# Patient Record
Sex: MALE | Race: WHITE | Employment: FULL TIME | URBAN - METROPOLITAN AREA
[De-identification: names, ages, dates, MRNs, and addresses within clinical notes are randomized per-mention and may not be internally consistent; named-entity substitution may affect disease eponyms.]

---

## 2023-05-09 DIAGNOSIS — L20.9 ATOPIC DERMATITIS, UNSPECIFIED TYPE: ICD-10-CM

## 2023-05-09 RX ORDER — BETAMETHASONE DIPROPIONATE 0.5 MG/G
OINTMENT, AUGMENTED TOPICAL 2 TIMES DAILY
COMMUNITY
End: 2023-05-09 | Stop reason: SDUPTHER

## 2023-05-09 RX ORDER — BETAMETHASONE DIPROPIONATE 0.5 MG/G
OINTMENT, AUGMENTED TOPICAL 2 TIMES DAILY
Qty: 15 G | Refills: 0 | Status: SHIPPED | OUTPATIENT
Start: 2023-05-09 | End: 2023-07-20

## 2023-05-09 NOTE — TELEPHONE ENCOUNTER
Pt. Requesting refill;  Betamethasone dipropionate aug. 0.05 ointment  [facility]   Appt-5/16/23

## 2023-05-09 NOTE — TELEPHONE ENCOUNTER
Betamethasone 0.05 ointment    [facility]    NOV - 05/16/23    W/1 refill  BN

## 2023-05-15 PROBLEM — E03.9 HYPOTHYROIDISM: Status: ACTIVE | Noted: 2023-05-15

## 2023-05-15 PROBLEM — N52.9 ERECTILE DYSFUNCTION: Status: ACTIVE | Noted: 2023-05-15

## 2023-05-15 PROBLEM — R00.2 PALPITATIONS: Status: ACTIVE | Noted: 2023-05-15

## 2023-05-15 PROBLEM — G47.33 OSA (OBSTRUCTIVE SLEEP APNEA): Status: ACTIVE | Noted: 2023-05-15

## 2023-05-15 PROBLEM — F17.200 NICOTINE ADDICTION: Status: ACTIVE | Noted: 2023-05-15

## 2023-05-15 PROBLEM — E55.9 VITAMIN D DEFICIENCY: Status: ACTIVE | Noted: 2023-05-15

## 2023-05-15 PROBLEM — E78.00 HYPERCHOLESTEROLEMIA: Status: ACTIVE | Noted: 2023-05-15

## 2023-05-15 PROBLEM — I10 HYPERTENSION: Status: ACTIVE | Noted: 2023-05-15

## 2023-05-15 PROBLEM — L20.9 ATOPIC DERMATITIS: Status: ACTIVE | Noted: 2023-05-15

## 2023-05-15 RX ORDER — IRBESARTAN 300 MG/1
300 TABLET ORAL DAILY
COMMUNITY
End: 2023-11-20

## 2023-05-15 RX ORDER — LEVOTHYROXINE SODIUM 100 UG/1
100 TABLET ORAL DAILY
COMMUNITY
End: 2023-11-13

## 2023-05-15 RX ORDER — CHLORTHALIDONE 50 MG/1
50 TABLET ORAL DAILY
COMMUNITY
Start: 2022-12-08 | End: 2024-01-03

## 2023-05-15 RX ORDER — CHOLECALCIFEROL (VITAMIN D3) 125 MCG
125 CAPSULE ORAL DAILY
COMMUNITY
Start: 2018-05-20

## 2023-05-15 RX ORDER — ATORVASTATIN CALCIUM 10 MG/1
10 TABLET, FILM COATED ORAL DAILY
COMMUNITY
End: 2023-09-12

## 2023-05-15 RX ORDER — METOPROLOL SUCCINATE 50 MG/1
50 TABLET, EXTENDED RELEASE ORAL DAILY
COMMUNITY
Start: 2023-03-14 | End: 2023-11-20

## 2024-01-18 ENCOUNTER — TELEPHONE (OUTPATIENT)
Dept: PRIMARY CARE | Facility: CLINIC | Age: 54
End: 2024-01-18
Payer: COMMERCIAL

## 2024-01-18 DIAGNOSIS — L20.9 ATOPIC DERMATITIS, UNSPECIFIED TYPE: ICD-10-CM

## 2024-01-18 DIAGNOSIS — E03.9 HYPOTHYROIDISM, UNSPECIFIED: ICD-10-CM

## 2024-01-18 RX ORDER — LEVOTHYROXINE SODIUM 100 UG/1
100 TABLET ORAL DAILY
Qty: 30 TABLET | Refills: 0 | Status: CANCELLED | OUTPATIENT
Start: 2024-01-18

## 2024-01-18 RX ORDER — BETAMETHASONE DIPROPIONATE 0.5 MG/G
OINTMENT, AUGMENTED TOPICAL 2 TIMES DAILY
Qty: 15 G | Refills: 0 | Status: CANCELLED | OUTPATIENT
Start: 2024-01-18

## 2024-01-18 RX ORDER — LEVOTHYROXINE SODIUM 100 UG/1
100 TABLET ORAL DAILY
Qty: 30 TABLET | Refills: 0 | Status: SHIPPED | OUTPATIENT
Start: 2024-01-18 | End: 2024-02-01 | Stop reason: SDUPTHER

## 2024-01-18 RX ORDER — BETAMETHASONE DIPROPIONATE 0.5 MG/G
OINTMENT, AUGMENTED TOPICAL 2 TIMES DAILY
Qty: 15 G | Refills: 0 | Status: SHIPPED | OUTPATIENT
Start: 2024-01-18

## 2024-01-18 NOTE — TELEPHONE ENCOUNTER
Pt needs refills on   Levothyroxine 100 mcg   Betamethasone 0.05 topical    He is almost out  Next appt 2/2/24    Drug [place]  [phone]   yes

## 2024-01-18 NOTE — TELEPHONE ENCOUNTER
Med refill    levothyroxine (Synthroid, Levoxyl) 100 mcg tablet   TAKE 1 TABLET BY MOUTH DAILY     betamethasone, augmented, (Diprolene) 0.05 % ointment   Apply topically 2 times a day.     [facility] - Osvaldo WEBB

## 2024-01-19 ENCOUNTER — APPOINTMENT (OUTPATIENT)
Dept: PRIMARY CARE | Facility: CLINIC | Age: 54
End: 2024-01-19
Payer: COMMERCIAL

## 2024-02-01 ENCOUNTER — OFFICE VISIT (OUTPATIENT)
Dept: PRIMARY CARE | Facility: CLINIC | Age: 54
End: 2024-02-01
Payer: COMMERCIAL

## 2024-02-01 ENCOUNTER — LAB (OUTPATIENT)
Dept: LAB | Facility: LAB | Age: 54
End: 2024-02-01
Payer: COMMERCIAL

## 2024-02-01 VITALS
HEIGHT: 72 IN | WEIGHT: 236.4 LBS | SYSTOLIC BLOOD PRESSURE: 115 MMHG | BODY MASS INDEX: 32.02 KG/M2 | HEART RATE: 72 BPM | OXYGEN SATURATION: 95 % | DIASTOLIC BLOOD PRESSURE: 63 MMHG

## 2024-02-01 DIAGNOSIS — I10 ESSENTIAL (PRIMARY) HYPERTENSION: ICD-10-CM

## 2024-02-01 DIAGNOSIS — E78.00 PURE HYPERCHOLESTEROLEMIA, UNSPECIFIED: ICD-10-CM

## 2024-02-01 DIAGNOSIS — E78.00 HYPERCHOLESTEROLEMIA: ICD-10-CM

## 2024-02-01 DIAGNOSIS — E03.9 HYPOTHYROIDISM, UNSPECIFIED TYPE: ICD-10-CM

## 2024-02-01 DIAGNOSIS — E03.9 HYPOTHYROIDISM, UNSPECIFIED: ICD-10-CM

## 2024-02-01 DIAGNOSIS — R06.2 WHEEZE: ICD-10-CM

## 2024-02-01 DIAGNOSIS — Z00.00 ANNUAL PHYSICAL EXAM: Primary | ICD-10-CM

## 2024-02-01 DIAGNOSIS — F17.210 CIGARETTE NICOTINE DEPENDENCE WITHOUT COMPLICATION: ICD-10-CM

## 2024-02-01 DIAGNOSIS — I10 PRIMARY HYPERTENSION: ICD-10-CM

## 2024-02-01 DIAGNOSIS — Z00.00 ANNUAL PHYSICAL EXAM: ICD-10-CM

## 2024-02-01 PROCEDURE — 80053 COMPREHEN METABOLIC PANEL: CPT

## 2024-02-01 PROCEDURE — 3074F SYST BP LT 130 MM HG: CPT | Performed by: INTERNAL MEDICINE

## 2024-02-01 PROCEDURE — 84443 ASSAY THYROID STIM HORMONE: CPT

## 2024-02-01 PROCEDURE — 84439 ASSAY OF FREE THYROXINE: CPT

## 2024-02-01 PROCEDURE — 3078F DIAST BP <80 MM HG: CPT | Performed by: INTERNAL MEDICINE

## 2024-02-01 PROCEDURE — 36415 COLL VENOUS BLD VENIPUNCTURE: CPT

## 2024-02-01 PROCEDURE — 99214 OFFICE O/P EST MOD 30 MIN: CPT | Performed by: INTERNAL MEDICINE

## 2024-02-01 PROCEDURE — 80061 LIPID PANEL: CPT

## 2024-02-01 PROCEDURE — 85027 COMPLETE CBC AUTOMATED: CPT

## 2024-02-01 PROCEDURE — 82306 VITAMIN D 25 HYDROXY: CPT

## 2024-02-01 RX ORDER — IRBESARTAN 300 MG/1
300 TABLET ORAL DAILY
Qty: 90 TABLET | Refills: 0 | Status: SHIPPED | OUTPATIENT
Start: 2024-02-01

## 2024-02-01 RX ORDER — CHLORTHALIDONE 50 MG/1
50 TABLET ORAL DAILY
Qty: 90 TABLET | Refills: 0 | Status: SHIPPED | OUTPATIENT
Start: 2024-02-01

## 2024-02-01 RX ORDER — ALBUTEROL SULFATE 90 UG/1
2 AEROSOL, METERED RESPIRATORY (INHALATION)
Qty: 8 G | Refills: 0 | Status: SHIPPED | OUTPATIENT
Start: 2024-02-01 | End: 2025-01-31

## 2024-02-01 RX ORDER — LEVOTHYROXINE SODIUM 100 UG/1
100 TABLET ORAL DAILY
Qty: 30 TABLET | Refills: 0 | Status: SHIPPED | OUTPATIENT
Start: 2024-02-01 | End: 2024-02-06 | Stop reason: DRUGHIGH

## 2024-02-01 RX ORDER — METOPROLOL SUCCINATE 50 MG/1
50 TABLET, EXTENDED RELEASE ORAL DAILY
Qty: 90 TABLET | Refills: 0 | Status: SHIPPED | OUTPATIENT
Start: 2024-02-01

## 2024-02-01 RX ORDER — ATORVASTATIN CALCIUM 10 MG/1
10 TABLET, FILM COATED ORAL DAILY
Qty: 90 TABLET | Refills: 0 | Status: SHIPPED | OUTPATIENT
Start: 2024-02-01

## 2024-02-01 ASSESSMENT — ENCOUNTER SYMPTOMS
COUGH: 0
RHINORRHEA: 1
HEMATURIA: 0
SINUS PRESSURE: 1
PALPITATIONS: 0
VOMITING: 0
CONSTIPATION: 0
LIGHT-HEADEDNESS: 0
NAUSEA: 0
FEVER: 0
DIFFICULTY URINATING: 0
WEAKNESS: 0
CHILLS: 0
DYSURIA: 0
FREQUENCY: 0
FATIGUE: 1
ARTHRALGIAS: 0
MYALGIAS: 0
SHORTNESS OF BREATH: 0
HEADACHES: 0
DIARRHEA: 0
SORE THROAT: 0
DIZZINESS: 0

## 2024-02-01 ASSESSMENT — PATIENT HEALTH QUESTIONNAIRE - PHQ9
2. FEELING DOWN, DEPRESSED OR HOPELESS: NOT AT ALL
1. LITTLE INTEREST OR PLEASURE IN DOING THINGS: NOT AT ALL
SUM OF ALL RESPONSES TO PHQ9 QUESTIONS 1 AND 2: 0

## 2024-02-01 ASSESSMENT — PAIN SCALES - GENERAL: PAINLEVEL: 0-NO PAIN

## 2024-02-01 NOTE — ASSESSMENT & PLAN NOTE
Patient is overdue for blood work and was given a requisition for fasting labs including lipids.  In the meantime he was given a refill on the atorvastatin

## 2024-02-01 NOTE — ASSESSMENT & PLAN NOTE
Blood pressure stable and well-controlled.  He was given refills of all his blood pressure medications and a requisition for annual blood work.

## 2024-02-01 NOTE — PROGRESS NOTES
Subjective   Patient ID: Marshal Newberry is a 53 y.o. male who presents for sinus and follow up for HTN management.  BN    Patient is here for 4-month follow-up for hypertension and medication management.  His wife wanted him to mention that he had a cold but feels like he is already turned the corner.  Symptoms began approximately 6 days ago but says in the last 2 days he is already feeling better.  He has a lot of nasal congestion drainage and a slight tightness in his chest but he continues to smoke cigarettes.  He states he is not ready to quit at this time although he knows he should        Review of Systems   Constitutional:  Positive for fatigue. Negative for chills and fever.   HENT:  Positive for postnasal drip, rhinorrhea and sinus pressure. Negative for sore throat.    Eyes:  Negative for visual disturbance.   Respiratory:  Negative for cough and shortness of breath.    Cardiovascular:  Negative for chest pain, palpitations and leg swelling.   Gastrointestinal:  Negative for constipation, diarrhea, nausea and vomiting.   Genitourinary:  Negative for difficulty urinating, dysuria, frequency, hematuria and urgency.   Musculoskeletal:  Negative for arthralgias and myalgias.   Skin:  Negative for rash.   Neurological:  Negative for dizziness, syncope, weakness, light-headedness and headaches.       Objective   Medication Documentation Review Audit       Reviewed by Maribel Arroyo MD (Physician) on 02/01/24 at 1152      Medication Order Taking? Sig Documenting Provider Last Dose Status   atorvastatin (Lipitor) 10 mg tablet [id number]  TAKE 1 TABLET BY MOUTH DAILY TAKE AS DIRECTED. Maribel Arroyo MD  Active   betamethasone, augmented, (Diprolene) 0.05 % ointment [id number]  Apply topically 2 times a day. Maribel Arroyo MD  Active   betamethasone, augmented, (Diprolene) 0.05 % ointment [id number]  Apply topically 2 times a day. Maribel Arroyo MD  Active   chlorthalidone (Hygroton) 50 mg tablet [id number]  TAKE  1 TABLET ONCE DAILY. Maribel Arroyo MD  Active   cholecalciferol (Vitamin D-3) 125 MCG (5000 UT) capsule [id number]  Take 1 capsule (125 mcg) by mouth once daily. Homer Ayon MD  Active   irbesartan (Avapro) 300 mg tablet [id number]  TAKE 1 TABLET DAILY. Maribel Arroyo MD  Active   levothyroxine (Synthroid, Levoxyl) 100 mcg tablet [id number]  Take 1 tablet (100 mcg) by mouth once daily. Maribel Arroyo MD  Active   metoprolol succinate XL (Toprol-XL) 50 mg 24 hr tablet [id number]  TAKE 1 TABLET DAILY. Maribel Arroyo MD  Active                  Allergies   Allergen Reactions    Lisinopril Cough    Paroxetine Rash     Physical Exam  Constitutional:       Appearance: Normal appearance.   HENT:      Head: Normocephalic and atraumatic.      Nose: Nose normal.   Eyes:      Extraocular Movements: Extraocular movements intact.      Pupils: Pupils are equal, round, and reactive to light.   Cardiovascular:      Rate and Rhythm: Normal rate and regular rhythm.   Pulmonary:      Breath sounds: Wheezing present.   Abdominal:      General: Abdomen is flat. Bowel sounds are normal.      Palpations: Abdomen is soft.   Musculoskeletal:      Right lower leg: No edema.      Left lower leg: No edema.   Neurological:      Mental Status: He is alert.     /63 (BP Location: Left arm, Patient Position: Sitting)   Pulse 72   Ht 1.829 m (6')   Wt 107 kg (236 lb 6.4 oz)   SpO2 95%   BMI 32.06 kg/m²       Assessment/Plan   Problem List Items Addressed This Visit       Hypercholesterolemia     Patient is overdue for blood work and was given a requisition for fasting labs including lipids.  In the meantime he was given a refill on the atorvastatin         Hypertension     Blood pressure stable and well-controlled.  He was given refills of all his blood pressure medications and a requisition for annual blood work.         Hypothyroidism     Patient was given a refill on the levothyroxine 100 mcg daily and he is due for TSH.          Nicotine addiction     Patient has a 40-pack-year history of cigarettes and I did strongly encourage him to quit.  At this time he admits he is just not ready to quit         Wheeze    Relevant Medications    albuterol (Ventolin HFA) 90 mcg/actuation inhaler    Hypothyroidism, unspecified    Relevant Medications    levothyroxine (Synthroid, Levoxyl) 100 mcg tablet    Other Relevant Orders    Lipid Panel    CBC    Comprehensive Metabolic Panel    TSH with reflex to Free T4 if abnormal    Vitamin D 25-Hydroxy,Total (for eval of Vitamin D levels)    Essential (primary) hypertension    Relevant Medications    chlorthalidone (Hygroton) 50 mg tablet    metoprolol succinate XL (Toprol-XL) 50 mg 24 hr tablet    irbesartan (Avapro) 300 mg tablet    Other Relevant Orders    Lipid Panel    CBC    Comprehensive Metabolic Panel    TSH with reflex to Free T4 if abnormal    Vitamin D 25-Hydroxy,Total (for eval of Vitamin D levels)     Other Visit Diagnoses       Annual physical exam    -  Primary    Relevant Orders    Lipid Panel    CBC    Comprehensive Metabolic Panel    TSH with reflex to Free T4 if abnormal    Vitamin D 25-Hydroxy,Total (for eval of Vitamin D levels)    Pure hypercholesterolemia, unspecified        Relevant Medications    atorvastatin (Lipitor) 10 mg tablet    Other Relevant Orders    Lipid Panel    CBC    Comprehensive Metabolic Panel    TSH with reflex to Free T4 if abnormal    Vitamin D 25-Hydroxy,Total (for eval of Vitamin D levels)                   It has been a pleasure seeing you.  Maribel Arroyo MD

## 2024-02-01 NOTE — ASSESSMENT & PLAN NOTE
>>ASSESSMENT AND PLAN FOR HYPERTENSION WRITTEN ON 2/1/2024 12:43 PM BY INDIA HOANG MD    Blood pressure stable and well-controlled.  He was given refills of all his blood pressure medications and a requisition for annual blood work.

## 2024-02-01 NOTE — ASSESSMENT & PLAN NOTE
Patient has a 40-pack-year history of cigarettes and I did strongly encourage him to quit.  At this time he admits he is just not ready to quit

## 2024-02-02 ENCOUNTER — APPOINTMENT (OUTPATIENT)
Dept: PRIMARY CARE | Facility: CLINIC | Age: 54
End: 2024-02-02
Payer: COMMERCIAL

## 2024-02-02 LAB
25(OH)D3 SERPL-MCNC: 38 NG/ML (ref 30–100)
ALBUMIN SERPL BCP-MCNC: 4.7 G/DL (ref 3.4–5)
ALP SERPL-CCNC: 91 U/L (ref 33–120)
ALT SERPL W P-5'-P-CCNC: 42 U/L (ref 10–52)
ANION GAP SERPL CALC-SCNC: 13 MMOL/L (ref 10–20)
AST SERPL W P-5'-P-CCNC: 27 U/L (ref 9–39)
BILIRUB SERPL-MCNC: 0.7 MG/DL (ref 0–1.2)
BUN SERPL-MCNC: 23 MG/DL (ref 6–23)
CALCIUM SERPL-MCNC: 9.9 MG/DL (ref 8.6–10.6)
CHLORIDE SERPL-SCNC: 98 MMOL/L (ref 98–107)
CHOLEST SERPL-MCNC: 171 MG/DL (ref 0–199)
CHOLESTEROL/HDL RATIO: 6.3
CO2 SERPL-SCNC: 29 MMOL/L (ref 21–32)
CREAT SERPL-MCNC: 0.94 MG/DL (ref 0.5–1.3)
EGFRCR SERPLBLD CKD-EPI 2021: >90 ML/MIN/1.73M*2
ERYTHROCYTE [DISTWIDTH] IN BLOOD BY AUTOMATED COUNT: 11.5 % (ref 11.5–14.5)
GLUCOSE SERPL-MCNC: 93 MG/DL (ref 74–99)
HCT VFR BLD AUTO: 43.7 % (ref 41–52)
HDLC SERPL-MCNC: 27 MG/DL
HGB BLD-MCNC: 16 G/DL (ref 13.5–17.5)
LDLC SERPL CALC-MCNC: 95 MG/DL
MCH RBC QN AUTO: 34 PG (ref 26–34)
MCHC RBC AUTO-ENTMCNC: 36.6 G/DL (ref 32–36)
MCV RBC AUTO: 93 FL (ref 80–100)
NON HDL CHOLESTEROL: 144 MG/DL (ref 0–149)
NRBC BLD-RTO: 0 /100 WBCS (ref 0–0)
PLATELET # BLD AUTO: 244 X10*3/UL (ref 150–450)
POTASSIUM SERPL-SCNC: 4 MMOL/L (ref 3.5–5.3)
PROT SERPL-MCNC: 7.9 G/DL (ref 6.4–8.2)
RBC # BLD AUTO: 4.7 X10*6/UL (ref 4.5–5.9)
SODIUM SERPL-SCNC: 136 MMOL/L (ref 136–145)
T4 FREE SERPL-MCNC: 1.51 NG/DL (ref 0.78–1.48)
TRIGL SERPL-MCNC: 244 MG/DL (ref 0–149)
TSH SERPL-ACNC: 0.05 MIU/L (ref 0.44–3.98)
VLDL: 49 MG/DL (ref 0–40)
WBC # BLD AUTO: 8.8 X10*3/UL (ref 4.4–11.3)

## 2024-02-06 DIAGNOSIS — E03.9 HYPOTHYROIDISM, UNSPECIFIED TYPE: Primary | ICD-10-CM

## 2024-02-06 RX ORDER — LEVOTHYROXINE SODIUM 88 UG/1
88 TABLET ORAL DAILY
Qty: 30 TABLET | Refills: 3 | Status: SHIPPED | OUTPATIENT
Start: 2024-02-06 | End: 2025-02-05

## 2024-06-21 ENCOUNTER — APPOINTMENT (OUTPATIENT)
Dept: PRIMARY CARE | Facility: CLINIC | Age: 54
End: 2024-06-21
Payer: COMMERCIAL

## 2024-07-12 ENCOUNTER — APPOINTMENT (OUTPATIENT)
Dept: PRIMARY CARE | Facility: CLINIC | Age: 54
End: 2024-07-12
Payer: COMMERCIAL

## 2024-07-12 ENCOUNTER — LAB (OUTPATIENT)
Dept: LAB | Facility: LAB | Age: 54
End: 2024-07-12
Payer: COMMERCIAL

## 2024-07-12 VITALS
DIASTOLIC BLOOD PRESSURE: 74 MMHG | HEIGHT: 72 IN | BODY MASS INDEX: 31.72 KG/M2 | OXYGEN SATURATION: 94 % | WEIGHT: 234.2 LBS | HEART RATE: 68 BPM | SYSTOLIC BLOOD PRESSURE: 114 MMHG

## 2024-07-12 DIAGNOSIS — E78.00 HYPERCHOLESTEROLEMIA: Primary | ICD-10-CM

## 2024-07-12 DIAGNOSIS — E03.9 HYPOTHYROIDISM, UNSPECIFIED TYPE: ICD-10-CM

## 2024-07-12 DIAGNOSIS — I10 ESSENTIAL (PRIMARY) HYPERTENSION: ICD-10-CM

## 2024-07-12 DIAGNOSIS — E78.00 PURE HYPERCHOLESTEROLEMIA, UNSPECIFIED: ICD-10-CM

## 2024-07-12 LAB
T4 FREE SERPL-MCNC: 1.46 NG/DL (ref 0.78–1.48)
TSH SERPL-ACNC: 0.02 MIU/L (ref 0.44–3.98)

## 2024-07-12 PROCEDURE — 36415 COLL VENOUS BLD VENIPUNCTURE: CPT

## 2024-07-12 PROCEDURE — 84439 ASSAY OF FREE THYROXINE: CPT

## 2024-07-12 PROCEDURE — 3078F DIAST BP <80 MM HG: CPT | Performed by: INTERNAL MEDICINE

## 2024-07-12 PROCEDURE — 84443 ASSAY THYROID STIM HORMONE: CPT

## 2024-07-12 PROCEDURE — 3074F SYST BP LT 130 MM HG: CPT | Performed by: INTERNAL MEDICINE

## 2024-07-12 PROCEDURE — 99214 OFFICE O/P EST MOD 30 MIN: CPT | Performed by: INTERNAL MEDICINE

## 2024-07-12 RX ORDER — METOPROLOL SUCCINATE 50 MG/1
50 TABLET, EXTENDED RELEASE ORAL DAILY
Qty: 90 TABLET | Refills: 3 | Status: SHIPPED | OUTPATIENT
Start: 2024-07-12

## 2024-07-12 RX ORDER — LEVOTHYROXINE SODIUM 88 UG/1
88 TABLET ORAL DAILY
Qty: 90 TABLET | Refills: 3 | Status: SHIPPED | OUTPATIENT
Start: 2024-07-12

## 2024-07-12 RX ORDER — IRBESARTAN 300 MG/1
300 TABLET ORAL DAILY
Qty: 90 TABLET | Refills: 3 | Status: SHIPPED | OUTPATIENT
Start: 2024-07-12

## 2024-07-12 RX ORDER — CHLORTHALIDONE 50 MG/1
50 TABLET ORAL DAILY
Qty: 90 TABLET | Refills: 3 | Status: SHIPPED | OUTPATIENT
Start: 2024-07-12

## 2024-07-12 RX ORDER — ATORVASTATIN CALCIUM 10 MG/1
10 TABLET, FILM COATED ORAL DAILY
Qty: 90 TABLET | Refills: 3 | Status: SHIPPED | OUTPATIENT
Start: 2024-07-12

## 2024-07-12 ASSESSMENT — ENCOUNTER SYMPTOMS
FEVER: 0
VOMITING: 0
TROUBLE SWALLOWING: 0
ARTHRALGIAS: 0
DYSURIA: 0
LIGHT-HEADEDNESS: 0
NAUSEA: 0
DIZZINESS: 0
FREQUENCY: 0
CONSTIPATION: 0
PALPITATIONS: 0
SORE THROAT: 0
ABDOMINAL PAIN: 0
FATIGUE: 0
DIARRHEA: 0
COUGH: 0
SHORTNESS OF BREATH: 0

## 2024-07-12 ASSESSMENT — PATIENT HEALTH QUESTIONNAIRE - PHQ9
1. LITTLE INTEREST OR PLEASURE IN DOING THINGS: NOT AT ALL
SUM OF ALL RESPONSES TO PHQ9 QUESTIONS 1 AND 2: 0
2. FEELING DOWN, DEPRESSED OR HOPELESS: NOT AT ALL

## 2024-07-12 ASSESSMENT — PAIN SCALES - GENERAL: PAINLEVEL: 0-NO PAIN

## 2024-07-12 NOTE — ASSESSMENT & PLAN NOTE
Patient's thyroid disease is stable with levothyroxine 88 mcg daily.  Annual blood work was completed in February but his thyroid was slightly abnormal so I wanted a repeat level in May.  Patient forgot about the test so he will get blood work today

## 2024-07-12 NOTE — ASSESSMENT & PLAN NOTE
Hypercholesterolemia is controlled with a atorvastatin 10 mg daily and patient finished blood work in February.

## 2024-07-12 NOTE — ASSESSMENT & PLAN NOTE
Blood pressure stable and well-controlled.  Patient's chlorthalidone, irbesartan and metoprolol were all refilled.  Patient does get leg cramps at night and questions if he should cut down or change his chlorthalidone.  I recommended he drink 1 Gatorade every day to see if it helps and if it does not he can call us back we will consider changing his diuretic.

## 2024-07-12 NOTE — PROGRESS NOTES
Subjective   Patient ID: Marshal Newberry is a 53 y.o. male who presents for 4 month follow up for HTN, cholesterol, and thyroid management.    Patient is here for routine 4-month follow-up for high cholesterol thyroid disease and hypertension.  He was supposed to get a repeat thyroid level but did not in May.  He was asked to get it today or soon as possible        Review of Systems   Constitutional:  Negative for fatigue and fever.   HENT:  Negative for sore throat and trouble swallowing.    Eyes:  Negative for visual disturbance.   Respiratory:  Negative for cough and shortness of breath.    Cardiovascular:  Negative for chest pain, palpitations and leg swelling.   Gastrointestinal:  Negative for abdominal pain, constipation, diarrhea, nausea and vomiting.   Genitourinary:  Negative for dysuria and frequency.   Musculoskeletal:  Negative for arthralgias.   Skin:  Negative for rash.   Neurological:  Negative for dizziness and light-headedness.       Objective   Medication Documentation Review Audit       Reviewed by Maribel Arroyo MD (Physician) on 07/12/24 at 0909      Medication Order Taking? Sig Documenting Provider Last Dose Status   albuterol (Ventolin HFA) 90 mcg/actuation inhaler [id number]  Inhale 2 puffs 3 times a day. Maribel Arroyo MD  Active   atorvastatin (Lipitor) 10 mg tablet [id number]  Take 1 tablet (10 mg) by mouth once daily. Take as directed Maribel Arroyo MD  Active   betamethasone, augmented, (Diprolene) 0.05 % ointment [id number]  Apply topically 2 times a day. Maribel Arroyo MD  Active   betamethasone, augmented, (Diprolene) 0.05 % ointment [id number]  Apply topically 2 times a day. Maribel Arroyo MD  Active   chlorthalidone (Hygroton) 50 mg tablet [id number]  Take 1 tablet (50 mg) by mouth once daily. Maribel Arroyo MD  Active   cholecalciferol (Vitamin D-3) 125 MCG (5000 UT) capsule [id number]  Take 1 capsule (125 mcg) by mouth once daily. Historical Provider, MD  Active   irbesartan  (Avapro) 300 mg tablet [id number]  Take 1 tablet (300 mg) by mouth once daily. Maribel Arroyo MD  Active   levothyroxine (Synthroid, Levoxyl) 88 mcg tablet [id number]  Take 1 tablet (88 mcg) by mouth once daily. Maribel Arroyo MD  Active   metoprolol succinate XL (Toprol-XL) 50 mg 24 hr tablet [id number]  Take 1 tablet (50 mg) by mouth once daily. Maribel Arroyo MD  Active                  Allergies   Allergen Reactions    Lisinopril Cough    Paroxetine Rash     Physical Exam  Constitutional:       Appearance: Normal appearance.   HENT:      Head: Normocephalic and atraumatic.      Nose: Nose normal.   Eyes:      Extraocular Movements: Extraocular movements intact.      Pupils: Pupils are equal, round, and reactive to light.   Cardiovascular:      Rate and Rhythm: Normal rate and regular rhythm.   Pulmonary:      Breath sounds: Normal breath sounds.   Abdominal:      General: Abdomen is flat. Bowel sounds are normal.      Palpations: Abdomen is soft.   Musculoskeletal:      Right lower leg: No edema.      Left lower leg: No edema.   Neurological:      Mental Status: He is alert.     /74 (BP Location: Left arm, Patient Position: Sitting)   Pulse 68   Ht 1.829 m (6')   Wt 106 kg (234 lb 3.2 oz)   SpO2 94%   BMI 31.76 kg/m²       Assessment/Plan   Problem List Items Addressed This Visit       Hypercholesterolemia - Primary     Hypercholesterolemia is controlled with a atorvastatin 10 mg daily and patient finished blood work in February.         Hypothyroidism     Patient's thyroid disease is stable with levothyroxine 88 mcg daily.  Annual blood work was completed in February but his thyroid was slightly abnormal so I wanted a repeat level in May.  Patient forgot about the test so he will get blood work today         Relevant Medications    levothyroxine (Synthroid, Levoxyl) 88 mcg tablet    Essential (primary) hypertension     Blood pressure stable and well-controlled.  Patient's chlorthalidone, irbesartan  and metoprolol were all refilled.  Patient does get leg cramps at night and questions if he should cut down or change his chlorthalidone.  I recommended he drink 1 Gatorade every day to see if it helps and if it does not he can call us back we will consider changing his diuretic.         Relevant Medications    chlorthalidone (Hygroton) 50 mg tablet    irbesartan (Avapro) 300 mg tablet    metoprolol succinate XL (Toprol-XL) 50 mg 24 hr tablet     Other Visit Diagnoses       Pure hypercholesterolemia, unspecified        Relevant Medications    atorvastatin (Lipitor) 10 mg tablet                   It has been a pleasure seeing you.  Maribel Arroyo MD

## 2024-07-14 DIAGNOSIS — E03.9 HYPOTHYROIDISM, UNSPECIFIED TYPE: Primary | ICD-10-CM

## 2024-11-15 ENCOUNTER — APPOINTMENT (OUTPATIENT)
Dept: PRIMARY CARE | Facility: CLINIC | Age: 54
End: 2024-11-15
Payer: COMMERCIAL

## 2024-11-15 VITALS
OXYGEN SATURATION: 96 % | DIASTOLIC BLOOD PRESSURE: 78 MMHG | SYSTOLIC BLOOD PRESSURE: 129 MMHG | HEIGHT: 72 IN | BODY MASS INDEX: 32.23 KG/M2 | WEIGHT: 238 LBS | HEART RATE: 63 BPM

## 2024-11-15 DIAGNOSIS — K04.7 TOOTH INFECTION: ICD-10-CM

## 2024-11-15 DIAGNOSIS — Z00.00 ANNUAL PHYSICAL EXAM: Primary | ICD-10-CM

## 2024-11-15 DIAGNOSIS — E78.00 HYPERCHOLESTEROLEMIA: ICD-10-CM

## 2024-11-15 DIAGNOSIS — I10 ESSENTIAL (PRIMARY) HYPERTENSION: ICD-10-CM

## 2024-11-15 DIAGNOSIS — E03.9 HYPOTHYROIDISM, UNSPECIFIED TYPE: ICD-10-CM

## 2024-11-15 DIAGNOSIS — F17.210 CIGARETTE NICOTINE DEPENDENCE WITHOUT COMPLICATION: ICD-10-CM

## 2024-11-15 PROCEDURE — 99214 OFFICE O/P EST MOD 30 MIN: CPT | Performed by: INTERNAL MEDICINE

## 2024-11-15 PROCEDURE — 3078F DIAST BP <80 MM HG: CPT | Performed by: INTERNAL MEDICINE

## 2024-11-15 PROCEDURE — 3008F BODY MASS INDEX DOCD: CPT | Performed by: INTERNAL MEDICINE

## 2024-11-15 PROCEDURE — 3074F SYST BP LT 130 MM HG: CPT | Performed by: INTERNAL MEDICINE

## 2024-11-15 RX ORDER — IBUPROFEN 200 MG
1 TABLET ORAL EVERY 24 HOURS
Qty: 30 PATCH | Refills: 3 | Status: SHIPPED | OUTPATIENT
Start: 2024-11-15

## 2024-11-15 RX ORDER — AMOXICILLIN AND CLAVULANATE POTASSIUM 875; 125 MG/1; MG/1
875 TABLET, FILM COATED ORAL 2 TIMES DAILY
Qty: 20 TABLET | Refills: 0 | Status: SHIPPED | OUTPATIENT
Start: 2024-11-15 | End: 2024-11-25

## 2024-11-15 ASSESSMENT — PATIENT HEALTH QUESTIONNAIRE - PHQ9
2. FEELING DOWN, DEPRESSED OR HOPELESS: NOT AT ALL
SUM OF ALL RESPONSES TO PHQ9 QUESTIONS 1 AND 2: 0
1. LITTLE INTEREST OR PLEASURE IN DOING THINGS: NOT AT ALL

## 2024-11-15 ASSESSMENT — ENCOUNTER SYMPTOMS
PALPITATIONS: 0
SORE THROAT: 0
ABDOMINAL PAIN: 0
LIGHT-HEADEDNESS: 0
DIZZINESS: 0
ARTHRALGIAS: 0
FATIGUE: 0
COUGH: 0
DYSURIA: 0
VOMITING: 0
SHORTNESS OF BREATH: 0
CONSTIPATION: 0
TROUBLE SWALLOWING: 0
DIARRHEA: 0
FREQUENCY: 0
NAUSEA: 0
FEVER: 0

## 2024-11-15 ASSESSMENT — PAIN SCALES - GENERAL: PAINLEVEL_OUTOF10: 4

## 2024-11-15 NOTE — PROGRESS NOTES
"Subjective   Patient ID: Armin Newberry \"Radha" is a 54 y.o. male who presents for 4 month follow for Htn an cholesterol management.    Patient is here for scheduled 4-month follow-up for hypertension thyroid disease nicotine dependence and cholesterol.  He has 2 other issues he would like addressed.  First he has some sort of gum or tooth infection in the left upper jaw.  He has an appoint with the dentist but not till December and wants to know if there is anything he can do between now and then because it is becoming quite painful    Patient also has been vaping the equivalent of a pack per day would like to get on the nicotine patch and wants to know if I can send in a prescription so he can switch over from the vape to the patch in an attempt to start weaning and discontinuation of nicotine altogether    Patient was offered a flu shot today but he declined at        Review of Systems   Constitutional:  Negative for fatigue and fever.   HENT:  Positive for dental problem. Negative for sore throat and trouble swallowing.         Patient complains of pain in the left jaw and possible gum infection.  He has pain with chewing inflammation in the gum and jaw and irritation.  He does have an appoint with a dentist but not until December which is about 2 weeks away   Eyes:  Negative for visual disturbance.   Respiratory:  Negative for cough and shortness of breath.    Cardiovascular:  Negative for chest pain, palpitations and leg swelling.   Gastrointestinal:  Negative for abdominal pain, constipation, diarrhea, nausea and vomiting.   Genitourinary:  Negative for dysuria and frequency.   Musculoskeletal:  Negative for arthralgias.   Skin:  Negative for rash.   Neurological:  Negative for dizziness and light-headedness.       Objective   Medication Documentation Review Audit       Reviewed by Maribel Arroyo MD (Physician) on 11/15/24 at 0920      Medication Order Taking? Sig Documenting Provider Last Dose Status "   albuterol (Ventolin HFA) 90 mcg/actuation inhaler [id number]  Inhale 2 puffs 3 times a day. Maribel Arroyo MD  Active   atorvastatin (Lipitor) 10 mg tablet [id number]  Take 1 tablet (10 mg) by mouth once daily. Take as directed Maribel Arroyo MD  Active   betamethasone, augmented, (Diprolene) 0.05 % ointment [id number]  Apply topically 2 times a day. Maribel Arroyo MD  Active   betamethasone, augmented, (Diprolene) 0.05 % ointment [id number]  Apply topically 2 times a day. Maribel Arroyo MD  Active   chlorthalidone (Hygroton) 50 mg tablet [id number]  Take 1 tablet (50 mg) by mouth once daily. Maribel Arroyo MD  Active   cholecalciferol (Vitamin D-3) 125 MCG (5000 UT) capsule [id number]  Take 1 capsule (125 mcg) by mouth once daily. Homer Ayon MD  Active   irbesartan (Avapro) 300 mg tablet [id number]  Take 1 tablet (300 mg) by mouth once daily. Maribel Arroyo MD  Active   levothyroxine (Synthroid, Levoxyl) 88 mcg tablet [id number]  Take 1 tablet (88 mcg) by mouth once daily. Maribel Arroyo MD  Active   metoprolol succinate XL (Toprol-XL) 50 mg 24 hr tablet [id number]  Take 1 tablet (50 mg) by mouth once daily. Maribel Arroyo MD  Active                  Allergies   Allergen Reactions    Lisinopril Cough    Paroxetine Rash       /78   Pulse 63   Ht 1.829 m (6')   Wt 108 kg (238 lb)   SpO2 96%   BMI 32.28 kg/m²     Physical Exam  Constitutional:       Appearance: Normal appearance.   HENT:      Head: Normocephalic and atraumatic.      Comments: Left inner upper jaw with red erythematous tissue around the gums and one of the posterior molars.     Nose: Nose normal.   Eyes:      Extraocular Movements: Extraocular movements intact.      Pupils: Pupils are equal, round, and reactive to light.   Cardiovascular:      Rate and Rhythm: Normal rate and regular rhythm.   Pulmonary:      Breath sounds: Normal breath sounds.   Abdominal:      General: Abdomen is flat. Bowel sounds are normal.      Palpations:  Detail Level: Detailed Abdomen is soft.   Musculoskeletal:      Right lower leg: No edema.      Left lower leg: No edema.   Neurological:      Mental Status: He is alert.           Assessment/Plan   Problem List Items Addressed This Visit       Hypercholesterolemia     Annual blood work is due in [place] 2 months or February.  He was given a requisition.  He will stay on a atorvastatin 10 mg daily until we get the repeat blood work before his next appointment         Hypothyroidism     Patient is stable on his levothyroxine 88 mcg daily.  TSH will be checked before his next appointment in 4 months.         Nicotine addiction     Patient was given a prescription for NicoDerm patches which she will start using to help replace the vaping.  If he has any issues or complaints he is to let us know.  He has done this in the past and did not seek instructions at this time         Relevant Medications    nicotine (Nicoderm CQ) 14 mg/24 hr patch    Essential (primary) hypertension     Blood pressure stable and well-controlled.  He did not refill need any refills on his metoprolol, irbesartan or chlorthalidone         Tooth infection     Patient has a tooth infection in the left jaw.  At this time I will place him on Augmentin 875 twice daily and given 10 days of antibiotic which should get him through until he sees the dentist.         Relevant Medications    amoxicillin-pot clavulanate (Augmentin) 875-125 mg tablet     Other Visit Diagnoses       Annual physical exam    -  Primary    Relevant Orders    Lipid Panel    CBC    Comprehensive Metabolic Panel    TSH with reflex to Free T4 if abnormal    Vitamin D 25-Hydroxy,Total (for eval of Vitamin D levels)                   It has been a pleasure seeing you.  Maribel Arroyo MD     X Size Of Lesion In Cm (Optional): 0 Size Of Lesion: 0.8 Size Of Lesion: 1.5

## 2024-11-15 NOTE — ASSESSMENT & PLAN NOTE
Annual blood work is due in [facility] [place] 2 months or February.  He was given a requisition.  He will stay on a atorvastatin 10 mg daily until we get the repeat blood work before his next appointment

## 2024-11-15 NOTE — ASSESSMENT & PLAN NOTE
Blood pressure stable and well-controlled.  He did not refill need any refills on his metoprolol, irbesartan or chlorthalidone

## 2024-11-15 NOTE — ASSESSMENT & PLAN NOTE
Patient was given a prescription for NicoDerm patches which she will start using to help replace the vaping.  If he has any issues or complaints he is to let us know.  He has done this in the past and did not seek instructions at this time

## 2024-11-15 NOTE — ASSESSMENT & PLAN NOTE
Patient has a tooth infection in the left jaw.  At this time I will place him on Augmentin 875 twice daily and given 10 days of antibiotic which should get him through until he sees the dentist.

## 2024-11-15 NOTE — ASSESSMENT & PLAN NOTE
Patient is stable on his levothyroxine 88 mcg daily.  TSH will be checked before his next appointment in 4 months.

## 2025-07-04 ENCOUNTER — APPOINTMENT (OUTPATIENT)
Dept: GENERAL RADIOLOGY | Age: 55
End: 2025-07-04
Payer: COMMERCIAL

## 2025-07-04 ENCOUNTER — APPOINTMENT (OUTPATIENT)
Dept: CT IMAGING | Age: 55
End: 2025-07-04
Payer: COMMERCIAL

## 2025-07-04 ENCOUNTER — HOSPITAL ENCOUNTER (EMERGENCY)
Age: 55
Discharge: HOME | End: 2025-07-05
Attending: EMERGENCY MEDICINE
Payer: COMMERCIAL

## 2025-07-04 DIAGNOSIS — R55 SYNCOPE AND COLLAPSE: Primary | ICD-10-CM

## 2025-07-04 LAB
ANION GAP SERPL CALCULATED.3IONS-SCNC: 14 MEQ/L (ref 9–15)
BASOPHILS # BLD: 0 K/UL (ref 0–0.1)
BASOPHILS NFR BLD: 0.2 % (ref 0.2–1.2)
BUN SERPL-MCNC: 38 MG/DL (ref 6–20)
CALCIUM SERPL-MCNC: 9 MG/DL (ref 8.5–9.9)
CHLORIDE SERPL-SCNC: 99 MEQ/L (ref 95–107)
CO2 SERPL-SCNC: 24 MEQ/L (ref 20–31)
CREAT SERPL-MCNC: 0.9 MG/DL (ref 0.7–1.2)
EOSINOPHIL # BLD: 0.3 K/UL (ref 0–0.5)
EOSINOPHIL NFR BLD: 3.3 % (ref 0.8–7)
ERYTHROCYTE [DISTWIDTH] IN BLOOD BY AUTOMATED COUNT: 11.7 % (ref 11.6–14.4)
ETHANOL PERCENT: NORMAL G/DL
ETHANOLAMINE SERPL-MCNC: <10 MG/DL (ref 0–0.08)
GLUCOSE SERPL-MCNC: 111 MG/DL (ref 70–99)
HCT VFR BLD AUTO: 36.6 % (ref 42–52)
HGB BLD-MCNC: 12.9 G/DL (ref 13.7–17.5)
IMM GRANULOCYTES # BLD: 0 K/UL
IMM GRANULOCYTES NFR BLD: 0.2 %
LYMPHOCYTES # BLD: 1.8 K/UL (ref 1.3–3.6)
LYMPHOCYTES NFR BLD: 21.5 %
MCH RBC QN AUTO: 32 PG (ref 25.7–32.2)
MCHC RBC AUTO-ENTMCNC: 35.2 % (ref 32.3–36.5)
MCV RBC AUTO: 90.8 FL (ref 79–92.2)
MONOCYTES # BLD: 1.5 K/UL (ref 0.3–0.8)
MONOCYTES NFR BLD: 17.2 % (ref 5.3–12.2)
NEUTROPHILS # BLD: 4.9 K/UL (ref 1.8–5.4)
NEUTS SEG NFR BLD: 57.6 % (ref 34–67.9)
PLATELET # BLD AUTO: 227 K/UL (ref 163–337)
POTASSIUM SERPL-SCNC: 3.5 MEQ/L (ref 3.4–4.9)
RBC # BLD AUTO: 4.03 M/UL (ref 4.63–6.08)
SODIUM SERPL-SCNC: 137 MEQ/L (ref 135–144)
TROPONIN, HIGH SENSITIVITY: 15 NG/L (ref 0–19)
WBC # BLD AUTO: 8.5 K/UL (ref 4.2–9)

## 2025-07-04 PROCEDURE — 99285 EMERGENCY DEPT VISIT HI MDM: CPT

## 2025-07-04 PROCEDURE — 70450 CT HEAD/BRAIN W/O DYE: CPT

## 2025-07-04 PROCEDURE — 80048 BASIC METABOLIC PNL TOTAL CA: CPT

## 2025-07-04 PROCEDURE — 36415 COLL VENOUS BLD VENIPUNCTURE: CPT

## 2025-07-04 PROCEDURE — 71045 X-RAY EXAM CHEST 1 VIEW: CPT

## 2025-07-04 PROCEDURE — 93005 ELECTROCARDIOGRAM TRACING: CPT

## 2025-07-04 PROCEDURE — 82077 ASSAY SPEC XCP UR&BREATH IA: CPT

## 2025-07-04 PROCEDURE — 84484 ASSAY OF TROPONIN QUANT: CPT

## 2025-07-04 PROCEDURE — 85025 COMPLETE CBC W/AUTO DIFF WBC: CPT

## 2025-07-04 PROCEDURE — 2580000003 HC RX 258: Performed by: EMERGENCY MEDICINE

## 2025-07-04 RX ORDER — 0.9 % SODIUM CHLORIDE 0.9 %
1000 INTRAVENOUS SOLUTION INTRAVENOUS ONCE
Status: COMPLETED | OUTPATIENT
Start: 2025-07-04 | End: 2025-07-05

## 2025-07-04 RX ORDER — METOPROLOL SUCCINATE 50 MG/1
50 TABLET, EXTENDED RELEASE ORAL DAILY
COMMUNITY
Start: 2024-07-12

## 2025-07-04 RX ORDER — IRBESARTAN 300 MG/1
300 TABLET ORAL DAILY
COMMUNITY
Start: 2024-07-12

## 2025-07-04 RX ORDER — ATORVASTATIN CALCIUM 10 MG/1
10 TABLET, FILM COATED ORAL DAILY
COMMUNITY
Start: 2024-07-12

## 2025-07-04 RX ORDER — CHLORTHALIDONE 50 MG/1
50 TABLET ORAL DAILY
COMMUNITY
Start: 2024-07-12 | End: 2025-07-05

## 2025-07-04 RX ADMIN — SODIUM CHLORIDE 1000 ML: 0.9 INJECTION, SOLUTION INTRAVENOUS at 23:28

## 2025-07-04 ASSESSMENT — LIFESTYLE VARIABLES
HOW OFTEN DO YOU HAVE A DRINK CONTAINING ALCOHOL: MONTHLY OR LESS
HOW MANY STANDARD DRINKS CONTAINING ALCOHOL DO YOU HAVE ON A TYPICAL DAY: 1 OR 2

## 2025-07-04 ASSESSMENT — PAIN - FUNCTIONAL ASSESSMENT: PAIN_FUNCTIONAL_ASSESSMENT: NONE - DENIES PAIN

## 2025-07-05 VITALS
DIASTOLIC BLOOD PRESSURE: 67 MMHG | TEMPERATURE: 97.9 F | HEART RATE: 79 BPM | OXYGEN SATURATION: 94 % | SYSTOLIC BLOOD PRESSURE: 111 MMHG | BODY MASS INDEX: 32.2 KG/M2 | RESPIRATION RATE: 21 BRPM | HEIGHT: 71 IN | WEIGHT: 230 LBS

## 2025-07-05 LAB
AMPHET UR QL SCN: NEGATIVE
BARBITURATES UR QL SCN: NEGATIVE
BENZODIAZ UR QL SCN: NEGATIVE
BILIRUB UR QL STRIP: NEGATIVE
CANNABINOIDS UR QL SCN: POSITIVE
CLARITY UR: CLEAR
COCAINE UR QL SCN: NEGATIVE
COLOR UR: YELLOW
DRUG SCREEN COMMENT UR-IMP: ABNORMAL
FENTANYL SCREEN, URINE: NEGATIVE
GLUCOSE UR STRIP-MCNC: NEGATIVE MG/DL
HGB UR QL STRIP: NEGATIVE
KETONES UR STRIP-MCNC: NEGATIVE MG/DL
LEUKOCYTE ESTERASE UR QL STRIP: NEGATIVE
METHADONE UR QL SCN: NEGATIVE
NITRITE UR QL STRIP: NEGATIVE
OPIATES UR QL SCN: NEGATIVE
OXYCODONE UR QL SCN: NEGATIVE
PCP UR QL SCN: NEGATIVE
PH UR STRIP: 6 [PH] (ref 5–9)
PROT UR STRIP-MCNC: NEGATIVE MG/DL
SP GR UR STRIP: 1.01 (ref 1–1.03)
URINE REFLEX TO CULTURE: NORMAL
UROBILINOGEN UR STRIP-ACNC: 0.2 E.U./DL

## 2025-07-05 PROCEDURE — 81003 URINALYSIS AUTO W/O SCOPE: CPT

## 2025-07-05 PROCEDURE — 80307 DRUG TEST PRSMV CHEM ANLYZR: CPT

## 2025-07-05 RX ORDER — LEVOTHYROXINE SODIUM 50 UG/1
TABLET ORAL
COMMUNITY
Start: 2025-06-24

## 2025-07-05 RX ORDER — LEVOTHYROXINE SODIUM 75 UG/1
TABLET ORAL
COMMUNITY
Start: 2025-04-21

## 2025-07-05 NOTE — ED PROVIDER NOTES
[facility] EMERGENCY DEPARTMENT  EMERGENCY DEPARTMENT ENCOUNTER      Pt Name: Chase Saunders  MRN: [id number]  Birthdate 1970  Date of evaluation: 7/4/2025  Provider: HUDSON PLATT DO    CHIEF COMPLAINT       Chief Complaint   Patient presents with    Loss of Consciousness     Pt states he felt like he needed to stretch, stood up and fell. Pt's spouse states \"He went down face first.\"          HISTORY OF PRESENT ILLNESS   (Location/Symptom, Timing/Onset, Context/Setting, Quality, Duration, Modifying Factors, Severity)  Note limiting factors.   Chase Saunders is a 54 y.o. male who presents to the emergency department with a syncopal episode.  He felt like he needed to stretch and stood up and fell down.     The history is provided by the patient.   Loss of Consciousness  Associated symptoms: dizziness        Nursing Notes were reviewed.    REVIEW OF SYSTEMS    (2-9 systems for level 4, 10 or more for level 5)     Review of Systems   Cardiovascular:  Positive for syncope.   Neurological:  Positive for dizziness.       Except as noted above the remainder of the review of systems was reviewed and negative.       PAST MEDICAL HISTORY     Past Medical History:   Diagnosis Date    High cholesterol     Hyperthyroidism          SURGICAL HISTORY       Past Surgical History:   Procedure Laterality Date    HAND SURGERY           CURRENT MEDICATIONS       Previous Medications    ATORVASTATIN (LIPITOR) 10 MG TABLET    Take 1 tablet by mouth daily    CHLORTHALIDONE (HYGROTEN) 50 MG TABLET    Take 1 tablet by mouth daily    CHOLECALCIFEROL (VITAMIN D3 PO)    Take by mouth    FEXOFENADINE (ALLEGRA) 180 MG TABLET    Take 1 tablet by mouth daily    IRBESARTAN (AVAPRO) 300 MG TABLET    Take 1 tablet by mouth daily    LEVOTHYROXINE (SYNTHROID) 88 MCG TABLET    TAKE 1 TABLET BY MOUTH DAILY    METOPROLOL SUCCINATE (TOPROL XL) 50 MG EXTENDED RELEASE TABLET    Take 1 tablet by mouth daily       ALLERGIES     Paroxetine    FAMILY

## 2025-07-05 NOTE — ED NOTES
Patient presented to the ED for syncopal episodes.  Patient has been seeing his PCP for this.  Just recently started back on his BP medication hygroton.  Reports previous orthostatics negative.

## 2025-07-06 LAB
EKG ATRIAL RATE: 78 BPM
EKG DIAGNOSIS: NORMAL
EKG P AXIS: 45 DEGREES
EKG P-R INTERVAL: 160 MS
EKG Q-T INTERVAL: 410 MS
EKG QRS DURATION: 106 MS
EKG QTC CALCULATION (BAZETT): 467 MS
EKG R AXIS: 22 DEGREES
EKG T AXIS: 62 DEGREES
EKG VENTRICULAR RATE: 78 BPM

## 2025-07-06 PROCEDURE — 93010 ELECTROCARDIOGRAM REPORT: CPT | Performed by: INTERNAL MEDICINE
